# Patient Record
Sex: FEMALE | Race: WHITE
[De-identification: names, ages, dates, MRNs, and addresses within clinical notes are randomized per-mention and may not be internally consistent; named-entity substitution may affect disease eponyms.]

---

## 2022-12-05 ENCOUNTER — HOSPITAL ENCOUNTER (EMERGENCY)
Dept: HOSPITAL 75 - ER | Age: 22
Discharge: HOME | End: 2022-12-05
Payer: SELF-PAY

## 2022-12-05 VITALS — BODY MASS INDEX: 47.47 KG/M2 | WEIGHT: 257.94 LBS | HEIGHT: 61.81 IN

## 2022-12-05 VITALS — DIASTOLIC BLOOD PRESSURE: 77 MMHG | SYSTOLIC BLOOD PRESSURE: 124 MMHG

## 2022-12-05 DIAGNOSIS — Z88.1: ICD-10-CM

## 2022-12-05 DIAGNOSIS — J02.0: Primary | ICD-10-CM

## 2022-12-05 PROCEDURE — 99283 EMERGENCY DEPT VISIT LOW MDM: CPT

## 2022-12-05 NOTE — ED EENT
History of Present Illness


General


Chief Complaint:  Oral/Throat Problems


Stated Complaint:  STREP + | TONSILS SWOLLEN


Source:  patient


Exam Limitations:  no limitations





History of Present Illness


Date Seen by Provider:  Dec 5, 2022


Time Seen by Provider:  10:14


Initial Comments


22yoF with no pertinent PMH coming in due to sore throat in the setting of being

strep positive since Friday. Has been on amoxicillin since then, but not 

improving. Difficulty swallowing, no breathing difficulty.  No fever, cough, or 

any other concerns





Allergies and Home Medications


Allergies


Coded Allergies:  


     Penicillins (Verified  Allergy, Unknown, 12/5/22)


     clonidine (Verified  Allergy, Unknown, 12/5/22)


     morphine (Verified  Allergy, Unknown, 12/5/22)





Patient Home Medication List


Home Medication List Reviewed:  Yes





Review of Systems


Review of Systems


Constitutional:  No fever


Eyes:  No Symptoms Reported


Ears:  No Symptoms Reported


Nose:  no symptoms reported


Mouth:  no symptoms reported


Throat:  see HPI


Respiratory:  no symptoms reported


Cardiovascular:  no symptoms reported


Gastrointestinal:  no symptoms reported


Musculoskeletal:  no symptoms reported


Skin:  no symptoms reported


Neurological:  No Symptoms Reported


Hematologic/Lymphatic:  No Symptoms Reported


Immunological/Allergic:  no symptoms reported





All Other Systems Reviewed


Negative Unless Noted:  Yes





Past Medical-Social-Family Hx


Patient Social History


Tobacco Use?:  No


Substance use?:  No


Alcohol Use?:  No


Pt feels they are or have been:  No





Immunizations Up To Date


Influenza Vaccine Up-to-Date:  Yes; Up-to-Date


First/Initial COVID19 Vaccinat:  2021


Second COVID19 Vaccination Vinh:  2021





Past Medical History


Surgery/Hospitalization HX:  


GERD, PCOS





D&C


Surgeries:  Yes





Physical Exam


Height, Weight, BMI


Height: '"


Weight: lbs. oz. kg;  BMI


Method:


General Appearance:  WD/WN, no apparent distress


Eyes:  bilateral eye normal inspection


Ears:  bilateral ear auricle normal


Nose:  normal inspection


Mouth/Throat:  other (Tonsillar exudate and erythema but no swelling, uvula is 

midline, normal voice)


Neck:  non-tender, full range of motion, supple, normal inspection


Cardiovascular:  regular rate, rhythm, no edema, no murmur


Respiratory:  chest non-tender, lungs clear, normal breath sounds, no res

piratory distress, no accessory muscle use


Gastrointestinal:  normal bowel sounds, non tender, soft; No distended, No 

guarding, No rebound


Neurologic/Psychiatric:  no motor/sensory deficits, alert, normal mood/affect


Skin:  normal color, warm/dry





Progress/Results/Core Measures


Progress


Progress Note :  


Progress Note


22-year-old female above history coming in due to sore throat.  ABCs were intact

and vitals are stable on presentation.  Physical exam consistent with strep 

throat.  Given the documented allergy to penicillin with hives, we will change 

her over to clindamycin.  Also give her a steroid to try to help with the throat

discomfort.





Departure


Impression





   Primary Impression:  


   Streptococcal sore throat


Disposition:  01 HOME, SELF-CARE


Condition:  Stable





Departure-Patient Inst.


Decision time for Depature:  10:36


Referrals:  


DANIELLE ERICKSON DO (PCP)


Primary Care Physician








[facility]/CHRISTOPH (Family)


Primary Care Physician


Patient Instructions:  Strep Throat (DC)





Add. Discharge Instructions:  


You can throw away the amoxicillin, you will be on a new antibiotic for the next

week.  The steroid we gave you should be long-acting and will start to help 

later on today with the pain and discomfort.  Take the Tylenol as needed, and 

the Toradol for the pain.  Do not mix ibuprofen or naproxen with the Toradol.


Scripts


Ketorolac Tromethamine (Ketorolac Tromethamine) 10 Mg Tablet


10 MG PO Q6H for 3 Days, #12 TAB


   Prov: SKYLER BRASHER MD         12/5/22 


Clindamycin HCl (Clindamycin HCl) 300 Mg Capsule


300 MG PO QID for 7 Days, #28 CAP


   Prov: SKYLER BRASHER MD         12/5/22


Work/School Note:  Work Release Form   Date Seen in the Emergency Department:  

Dec 5, 2022


   Return to Work:  Dec 7, 2022


   Restrictions:  No Restrictions











SKYLER BRASHER MD           Dec 5, 2022 10:36

## 2023-03-03 ENCOUNTER — HOSPITAL ENCOUNTER (EMERGENCY)
Dept: HOSPITAL 75 - ER | Age: 23
Discharge: HOME | End: 2023-03-03
Payer: SELF-PAY

## 2023-03-03 VITALS — WEIGHT: 250 LBS | HEIGHT: 62.01 IN | BODY MASS INDEX: 45.43 KG/M2

## 2023-03-03 VITALS — DIASTOLIC BLOOD PRESSURE: 86 MMHG | SYSTOLIC BLOOD PRESSURE: 137 MMHG

## 2023-03-03 DIAGNOSIS — J03.91: Primary | ICD-10-CM

## 2023-03-03 DIAGNOSIS — Z88.0: ICD-10-CM

## 2023-03-03 DIAGNOSIS — E66.9: ICD-10-CM

## 2023-03-03 DIAGNOSIS — Z20.822: ICD-10-CM

## 2023-03-03 DIAGNOSIS — J20.9: ICD-10-CM

## 2023-03-03 DIAGNOSIS — J45.909: ICD-10-CM

## 2023-03-03 LAB
ALBUMIN SERPL-MCNC: 4 GM/DL (ref 3.2–4.5)
ALP SERPL-CCNC: 62 U/L (ref 40–136)
ALT SERPL-CCNC: 19 U/L (ref 0–55)
BASOPHILS # BLD AUTO: 0 10^3/UL (ref 0–0.1)
BASOPHILS NFR BLD AUTO: 0 % (ref 0–10)
BILIRUB SERPL-MCNC: 0.3 MG/DL (ref 0.1–1)
BUN/CREAT SERPL: 10
CALCIUM SERPL-MCNC: 8.8 MG/DL (ref 8.5–10.1)
CHLORIDE SERPL-SCNC: 103 MMOL/L (ref 98–107)
CO2 SERPL-SCNC: 25 MMOL/L (ref 21–32)
CREAT SERPL-MCNC: 0.71 MG/DL (ref 0.6–1.3)
EOSINOPHIL # BLD AUTO: 0 10^3/UL (ref 0–0.3)
EOSINOPHIL NFR BLD AUTO: 0 % (ref 0–10)
GFR SERPLBLD BASED ON 1.73 SQ M-ARVRAT: 123 ML/MIN
GLUCOSE SERPL-MCNC: 94 MG/DL (ref 70–105)
HCT VFR BLD CALC: 42 % (ref 35–52)
HGB BLD-MCNC: 14.4 G/DL (ref 11.5–16)
LYMPHOCYTES # BLD AUTO: 1.4 10^3/UL (ref 1–4)
LYMPHOCYTES NFR BLD AUTO: 15 % (ref 12–44)
MANUAL DIFFERENTIAL PERFORMED BLD QL: NO
MCH RBC QN AUTO: 28 PG (ref 25–34)
MCHC RBC AUTO-ENTMCNC: 34 G/DL (ref 32–36)
MCV RBC AUTO: 84 FL (ref 80–99)
MONOCYTES # BLD AUTO: 1.1 10^3/UL (ref 0–1)
MONOCYTES NFR BLD AUTO: 11 % (ref 0–12)
NEUTROPHILS # BLD AUTO: 7 10^3/UL (ref 1.8–7.8)
NEUTROPHILS NFR BLD AUTO: 73 % (ref 42–75)
PLATELET # BLD: 221 10^3/UL (ref 130–400)
PMV BLD AUTO: 10.6 FL (ref 9–12.2)
POTASSIUM SERPL-SCNC: 3.4 MMOL/L (ref 3.6–5)
PROT SERPL-MCNC: 7.2 GM/DL (ref 6.4–8.2)
SODIUM SERPL-SCNC: 139 MMOL/L (ref 135–145)
WBC # BLD AUTO: 9.6 10^3/UL (ref 4.3–11)

## 2023-03-03 PROCEDURE — 86141 C-REACTIVE PROTEIN HS: CPT

## 2023-03-03 PROCEDURE — 80053 COMPREHEN METABOLIC PANEL: CPT

## 2023-03-03 PROCEDURE — 87430 STREP A AG IA: CPT

## 2023-03-03 PROCEDURE — 85025 COMPLETE CBC W/AUTO DIFF WBC: CPT

## 2023-03-03 PROCEDURE — 36415 COLL VENOUS BLD VENIPUNCTURE: CPT

## 2023-03-03 PROCEDURE — 86308 HETEROPHILE ANTIBODY SCREEN: CPT

## 2023-03-03 PROCEDURE — 84703 CHORIONIC GONADOTROPIN ASSAY: CPT

## 2023-03-03 PROCEDURE — 71046 X-RAY EXAM CHEST 2 VIEWS: CPT

## 2023-03-03 PROCEDURE — 87636 SARSCOV2 & INF A&B AMP PRB: CPT

## 2023-03-03 NOTE — DIAGNOSTIC IMAGING REPORT
Indication: Sore throat and throat swelling as well as chest

congestion increasing shortness of air.



Time of Exam: 8:23 AM



No prior studies are available for comparison.



Findings: The heart size is normal. The pulmonary vascularity is

unremarkable. The lungs are clear. No infiltrate, effusion or

pneumothorax is detected.



Impression: No acute cardiopulmonary process is detected.



Dictated by: 



  Dictated on workstation # [id number]

## 2023-03-03 NOTE — ED GENERAL
General


Chief Complaint:  Oral/Throat Problems


Stated Complaint:  SOB


Nursing Triage Note:  


C/O SORE THROAT/SWELLING, CHEST CONGESTION, INCREASED SOA. REPORTS PROGRESSIVELY




WORSE OVER LAST 2 WEEKS. SEEN AT [facility] 3/1/23 STARTED ON PREDNISONE, ZYRTEC, 


FLONASE WITHOUT IMPROVEMENT.


Source of Information:  Patient


Exam Limitations:  No Limitations





History of Present Illness


Date Seen by Provider:  Mar 3, 2023


Time Seen by Provider:  06:46


Initial Comments


This 22-year-old young lady presents to the emergency room with 2 weeks of acute

illness including sore throat, swollen tonsils, fatigue, difficulty swallowing, 

generalized myalgia, diarrhea, shortness of breath, and sharp pain in the chest 

with breathing.  She has not had fevers that she is aware of.  She did present 

to the clinic and was prescribed steroids which she believes were not very 

helpful.  She does have a history of tonsillitis and strep throat in the past.  

She reports chronic tonsillar hypertrophy.  She also has a history of asthma but

does not presently use any inhaled medications.  She is notably tachypneic and 

tachycardic during assessment.  She reports no testing for viral illnesses or 

strep throat was performed in the clinic.





Allergies and Home Medications


Allergies


Coded Allergies:  


     Penicillins (Verified  Allergy, Unknown, 22)


     clonidine (Verified  Allergy, Unknown, 22)


     morphine (Verified  Allergy, Unknown, 22)





Patient Home Medication List


Home Medication List Reviewed:  Yes


Azithromycin (Azithromycin) 250 Mg Tablet, 500 MG PO DAILY


   Prescribed by: JENNIFER BURGER on 3/3/23 09


Cetirizine HCl (Zyrtec) 10 Mg Capsule, 10 MG PO, (Reported)


   Entered as Reported by: MARIANO VASQUEZ on 3/3/23 0645


   Last Action: New Order


Fluticasone Propionate (Flonase Allergy Relief) 50 Mcg/Actuation [place].susp, 2 

SPRAY NS DAILY, (Reported)


   Entered as Reported by: MARIANO VASQUEZ on 3/3/23 0645


   Last Action: New Order


Prednisone (Prednisone) 1 Mg Tab, Unknown Dose PO, (Reported)


   Entered as Reported by: MARIANO VASQUEZ on 3/3/23 0645


   Last Action: New Order


Discontinued Medications


Clindamycin HCl (Clindamycin HCl) 300 Mg Capsule, 300 MG PO QID


   Discontinued Reason: No Longer Taking


   Prescribed by: SKYLER BRASHER on 22 1037


   Last Action: Discontinued


Ketorolac Tromethamine (Ketorolac Tromethamine) 10 Mg Tablet, 10 MG PO Q6H


   Discontinued Reason: No Longer Taking


   Prescribed by: SKYLER BRASHER on 22 1037


   Last Action: Discontinued





Review of Systems


Review of Systems


Constitutional:  see HPI


EENTM:  see HPI


Respiratory:  see HPI


Cardiovascular:  see HPI


Gastrointestinal:  see HPI


Genitourinary:  no symptoms reported


Musculoskeletal:  see HPI


Skin:  no symptoms reported


Psychiatric/Neurological:  No Symptoms Reported


Hematologic/Lymphatic:  No Symptoms Reported


Immunological/Allergic:  no symptoms reported





Past Medical-Social-Family Hx


Patient Social History


Tobacco Use?:  No


Substance use?:  No


Alcohol Use?:  No


Pt feels they are or have been:  No





Immunizations Up To Date


First/Initial COVID19 Vaccinat:  


Second COVID19 Vaccination Vinh:  


Third COVID19 Vaccination Date:  





Past Medical History


Surgery/Hospitalization HX:  


GERD, PCOS





D&C


Surgeries:  Yes (Ectopic pregnancy)


Respiratory:  Yes


Asthma


Cardiac:  No


Neurological:  No


Pregnant:  No


Reproductive Disorders:  Yes


Female Reproductive Disorders:  Polycystic Ovarian Dis


Genitourinary:  No


Gastrointestinal:  Yes


Gastroesophageal Reflux


Musculoskeletal:  No


Endocrine:  Yes ("Thyroid problem")


HEENT:  Yes (Chronic tonsillar hypertrophy)


Cancer:  No


Psychosocial:  Yes


Integumentary:  No





Physical Exam


Vital Signs





Vital Signs - First Documented








 3/3/23





 06:40


 


Temp 37.4


 


Pulse 124


 


Resp 24


 


B/P (MAP) 152/97 (115)


 


Pulse Ox 100


 


O2 Delivery Room Air





Capillary Refill : Less Than 3 Seconds


Height, Weight, BMI


Height: '"


Weight: lbs. oz. kg; 45.00 BMI


Method:


General Appearance:  WD/WN, Mild Distress, Obese


HEENT:  PERRL/EOMI, TMs Normal, Pharyngeal Erythema, Tonsillar Exudate, 

Tonsillar Enlargement (Marked, nearly touching, symmetric)


Neck:  Normal Inspection


Respiratory:  Lungs Clear, No Accessory Muscle Use, No Respiratory Distress, Dec

reased Breath Sounds; No Wheezing; Other (Tachypnea, decreased air movement, 

prolonged expiratory phase)


Cardiovascular:  No Edema, No Murmur, Tachycardia


Extremity:  Normal Inspection


Neurologic/Psychiatric:  Alert, Oriented x3, No Motor/Sensory Deficits, Normal 

Mood/Affect


Skin:  Normal Color, Warm/Dry





Progress/Results/Core Measures


Suspected Sepsis


SIRS


Temperature: 


Pulse: 124 


Respiratory Rate: 24


 


Laboratory Tests


3/3/23 07:07: White Blood Count 9.6


Blood Pressure 152 /97 


Mean: 115


 





Laboratory Tests


3/3/23 07:07: 


Creatinine 0.71, Platelet Count 221, Total Bilirubin 0.3








Results/Orders


Lab Results





Laboratory Tests








Test


 3/3/23


06:53 3/3/23


06:58 3/3/23


07:07 Range/Units


 


 


Group A Streptococcus Screen NEGATIVE    NEGATIVE  


 


Influenza Type A (RT-PCR)  Not Detected   Not Detecte  


 


Influenza Type B (RT-PCR)  Not Detected   Not Detecte  


 


SARS-CoV-2 RNA (RT-PCR)  Not Detected   Not Detecte  


 


White Blood Count


 


 


 9.6 


 4.3-11.0


10^3/uL


 


Red Blood Count


 


 


 5.07 


 3.80-5.11


10^6/uL


 


Hemoglobin   14.4  11.5-16.0  g/dL


 


Hematocrit   42  35-52  %


 


Mean Corpuscular Volume   84  80-99  fL


 


Mean Corpuscular Hemoglobin   28  25-34  pg


 


Mean Corpuscular Hemoglobin


Concent 


 


 34 


 32-36  g/dL





 


Red Cell Distribution Width   12.9  10.0-14.5  %


 


Platelet Count


 


 


 221 


 130-400


10^3/uL


 


Mean Platelet Volume   10.6  9.0-12.2  fL


 


Immature Granulocyte % (Auto)   1   %


 


Neutrophils (%) (Auto)   73  42-75  %


 


Lymphocytes (%) (Auto)   15  12-44  %


 


Monocytes (%) (Auto)   11  0-12  %


 


Eosinophils (%) (Auto)   0  0-10  %


 


Basophils (%) (Auto)   0  0-10  %


 


Neutrophils # (Auto)


 


 


 7.0 


 1.8-7.8


10^3/uL


 


Lymphocytes # (Auto)


 


 


 1.4 


 1.0-4.0


10^3/uL


 


Monocytes # (Auto)


 


 


 1.1 H


 0.0-1.0


10^3/uL


 


Eosinophils # (Auto)


 


 


 0.0 


 0.0-0.3


10^3/uL


 


Basophils # (Auto)


 


 


 0.0 


 0.0-0.1


10^3/uL


 


Immature Granulocyte # (Auto)


 


 


 0.1 


 0.0-0.1


10^3/uL


 


Sodium Level   139  135-145  MMOL/L


 


Potassium Level   3.4 L 3.6-5.0  MMOL/L


 


Chloride Level   103    MMOL/L


 


Carbon Dioxide Level   25  21-32  MMOL/L


 


Anion Gap   11  5-14  MMOL/L


 


Blood Urea Nitrogen   7  7-18  MG/DL


 


Creatinine


 


 


 0.71 


 0.60-1.30


MG/DL


 


Estimat Glomerular Filtration


Rate 


 


 123 


  





 


BUN/Creatinine Ratio   10   


 


Glucose Level   94    MG/DL


 


Calcium Level   8.8  8.5-10.1  MG/DL


 


Corrected Calcium   8.8  8.5-10.1  MG/DL


 


Total Bilirubin   0.3  0.1-1.0  MG/DL


 


Aspartate Amino Transf


(AST/SGOT) 


 


 14 


 5-34  U/L





 


Alanine Aminotransferase


(ALT/SGPT) 


 


 19 


 0-55  U/L





 


Alkaline Phosphatase   62    U/L


 


C-Reactive Protein High


Sensitivity 


 


 4.43 H


 0.00-0.50


MG/DL


 


Total Protein   7.2  6.4-8.2  GM/DL


 


Albumin   4.0  3.2-4.5  GM/DL


 


Serum Pregnancy Test,


Qualitative 


 


 NEGATIVE 


 NEGATIVE  





 


Monoscreen   NEGATIVE  NEGATIVE  








My Orders





Orders - JENNIFER POWERS MD


Cbc With Automated Diff (3/3/23 06:56)


Comprehensive Metabolic Panel (3/3/23 06:56)


Hs C Reactive Protein (3/3/23 06:56)


Hcg,Qualitative Serum (3/3/23 06:56)


Monotest (3/3/23 06:56)


Rapid Strep A Screen (3/3/23 06:56)


Covid 19 Inhouse Test (3/3/23 06:56)


Influenza A And B By Pcr (3/3/23 06:56)


Ed Iv/Invasive Line Start (3/3/23 06:56)


Lactated Ringers (Lr 1000 Ml Iv Solution (3/3/23 07:00)


Ketorolac Injection (Toradol Injection) (3/3/23 07:00)


Albuterol Inhaler (Albuterol) (3/3/23 07:02)


Chest Pa/Lat (2 View) (3/3/23 08:00)





Medications Given in ED





Current Medications








 Medications  Dose


 Ordered  Sig/Elizabeth


 Route  Start Time


 Stop Time Status Last Admin


Dose Admin


 


 Ketorolac


 Tromethamine  30 mg  ONCE  ONCE


 IVP  3/3/23 07:00


 3/3/23 07:01 DC 3/3/23 07:05


30 MG


 


 Lactated Ringer's  1,000 ml @ 


 0 mls/hr  Q0M ONCE


 IV  3/3/23 07:00


 3/3/23 07:01 DC 3/3/23 07:05


999 MLS/HR








Vital Signs/I&O











 3/3/23 3/3/23





 06:40 09:17


 


Temp 37.4 36.5


 


Pulse 124 87


 


Resp 24 17


 


B/P (MAP) 152/97 (115) 137/86


 


Pulse Ox 100 98


 


O2 Delivery Room Air Room Air





Capillary Refill : Less Than 3 Seconds








Blood Pressure Mean:                    115








Progress Note #1:  


   Time:  07:11


Progress Note


Patient has been interviewed and examined.  This is a prolonged illness with 

worsening condition despite treatment with steroids.  She is notably tachycardic

and tachypneic with difficulty breathing and markedly enlarged tonsils.  This 

prolonged illness should be evaluated further with labs, strep testing, and 

viral testing.  Work-up including CBC, CMP, CRP, monoscreen, viral screening for

influenza and COVID, and rapid strep test is being performed.  If serum 

pregnancy test is negative, chest x-ray will also be obtained.  IV fluids are in

fusing.  Pain is being treated with Toradol.  Shortness of breath with prolonged

expiratory phase is being treated with an albuterol inhaler.  Further therapies 

will depend on results of tests.


Progress Note #2:  


Progress Note


Work-up was essentially unremarkable.  CBC, CMP, and CRP were reviewed by me and

were normal.  Screening test for infectious illnesses including mono, influenza,

COVID-19, and rapid strep were also all negative.  Chest x-ray was viewed by me 

and no acute abnormalities were appreciated.  Radiologist report was also 

reviewed and no acute abnormalities were appreciated.  Patient had prolonged 

expiratory phase on auscultation and albuterol inhaler was provided.  This did 

improve her breathing some.  She reports Toradol did not help her discomfort 

much.  Her heart rate did drop into the 1 teens after 300 mL of IV fluids and 

Toradol.  She only received 300 mL of IV fluids before her IV failed.  Patient 

was discharged in somewhat improved condition with a prescription for 

azithromycin.  She has tried amoxicillin and Augmentin in the past without much 

improvement in symptoms.  She has a penicillin allergy but can take amoxicillin 

products.  I advised referral to an ENT provider, but her lack of insurance 

makes this prohibitive.  We discussed options for looking into other types of 

insurance coverage.  Discharge instructions, prescription, and use of 

over-the-counter medications were reviewed with the patient.  See discharge 

instructions for further discussion.





Diagnostic Imaging





   Diagonstic Imaging:  Xray


   Plain Films/CT/US/NM/MRI:  chest


Comments


Chest x-ray viewed by me and report reviewed.  No acute abnormalities were 

appreciated by my interpretation.  Radiologist's report was also reviewed as 

below:





NAME:   ALEJANDRA WEINER  


[facility] REC#:   [id number]  


ACCOUNT#:   [id number]  


PT STATUS:   REG ER  


:   2000  


PHYSICIAN:   JENNIFER POWERS MD  


ADMIT DATE:   23/ER  


                                                                      

***Draft***  


Date of Exam:23  





CHEST PA/LAT (2 VIEW)  





Indication: Sore throat and throat swelling as well as chest  


 congestion increasing shortness of air.  





 Time of Exam: 8:23 AM  





 No prior studies are available for comparison.  





 Findings: The heart size is normal. The pulmonary vascularity is  


 unremarkable. The lungs are clear. No infiltrate, effusion or  


 pneumothorax is detected.  





 Impression: No acute cardiopulmonary process is detected.  





   Dictated on workstation # [id number]  





Dict:   23  


Trans:   23 08  


CV [id number]  





Interpreted by:     SABI JJ MD





Departure


Impression





   Primary Impression:  


   Recurrent tonsillitis


   Additional Impressions:  


   Acute bronchitis


   Qualified Codes:  J20.9 - Acute bronchitis, unspecified


   Flu-like symptoms


Disposition:  01 HOME, SELF-CARE


Condition:  Stable





Admissions


Decision to Admit/Date:  Mar 3, 2023


Time/Decision to Admit Time:  08:54





Departure-Patient Inst.


Decision time for Depature:  08:54


Referrals:  


BRIELLE BARAJAS (PCP)


Primary Care Physician








[facility]/CHRISTOPH (Family)


Primary Care Physician


Patient Instructions:  Sore Throat in Adults





Add. Discharge Instructions:  


Drink plenty of clear liquids to stay well-hydrated.


For pain or fever you may take ibuprofen up to 600 mg every 6 hours as needed 

and Tylenol (acetaminophen) up to 1000 mg every 6 hours as needed.


Complete the entire course of azithromycin as prescribed.  You should take 2 

tablets daily for 5 days.


Seek follow-up with your primary care provider next week to review throat 

culture results and to further evaluate your recurrent tonsillitis and a plan 

for further treatment of recurrent tonsillitis.


Use your inhaler up to 4 puffs in a 4-hour period of time as needed for 

wheezing, uncontrolled cough, or difficulty with expiration.


For your nasal congestion you may use over-the-counter Afrin (Oxymetazoline).  

Limit Afrin to 3 days only.  You may use over-the-counter Flonase (fluticasone) 

as a maintenance medication for sinus congestion, nasal allergies, etc.


On day 3 or 4 of antibiotics and again the day after completing antibiotic, 

sanitize or replace any oral instruments such as toothbrushes.


You may finish the steroids as prescribed.


Complete financial aid paperwork for the hospital if you have not already done 

so and seek options for insurance coverage on the exchange or through your 

family's employers.


Work toward weight loss to improve your general health and especially to improve

problems with breathing and fullness in the throat that may cause more severe 

symptoms with tonsillitis, sleep apnea, etc.


Return to care if you have worsening symptoms despite following these 

instructions.





All discharge instructions reviewed with patient and/or family. Voiced 

understanding.


Scripts


Azithromycin (Azithromycin) 250 Mg Tablet


500 MG PO DAILY, #10 TAB 0 Refills


   Prov: JENNIFER POWERS MD         3/3/23


Work/School Note:  Work Release Form   Date Seen in the Emergency Department:  

Mar 3, 2023


   Return to Work:  Mar 4, 2023


   Restrictions:  Return-No Fever (24hrs), Return-No Vomiting(24hrs)





Copy


Copies To 1:   [facility]/JENNIFER LIGHT MD         Mar 3, 2023 07:08

## 2023-03-04 ENCOUNTER — HOSPITAL ENCOUNTER (EMERGENCY)
Dept: HOSPITAL 75 - ER | Age: 23
Discharge: HOME | End: 2023-03-04
Payer: SELF-PAY

## 2023-03-04 VITALS — WEIGHT: 250 LBS | HEIGHT: 62.01 IN | BODY MASS INDEX: 45.43 KG/M2

## 2023-03-04 VITALS — DIASTOLIC BLOOD PRESSURE: 79 MMHG | SYSTOLIC BLOOD PRESSURE: 113 MMHG

## 2023-03-04 DIAGNOSIS — Z88.0: ICD-10-CM

## 2023-03-04 DIAGNOSIS — A41.9: ICD-10-CM

## 2023-03-04 DIAGNOSIS — Z20.822: ICD-10-CM

## 2023-03-04 DIAGNOSIS — J03.91: Primary | ICD-10-CM

## 2023-03-04 LAB
ALBUMIN SERPL-MCNC: 3.8 GM/DL (ref 3.2–4.5)
ALP SERPL-CCNC: 65 U/L (ref 40–136)
ALT SERPL-CCNC: 28 U/L (ref 0–55)
BASOPHILS # BLD AUTO: 0 10^3/UL (ref 0–0.1)
BASOPHILS NFR BLD AUTO: 0 % (ref 0–10)
BILIRUB SERPL-MCNC: 0.4 MG/DL (ref 0.1–1)
BUN/CREAT SERPL: 10
CALCIUM SERPL-MCNC: 9.2 MG/DL (ref 8.5–10.1)
CHLORIDE SERPL-SCNC: 103 MMOL/L (ref 98–107)
CO2 SERPL-SCNC: 22 MMOL/L (ref 21–32)
CREAT SERPL-MCNC: 0.68 MG/DL (ref 0.6–1.3)
EOSINOPHIL # BLD AUTO: 0 10^3/UL (ref 0–0.3)
EOSINOPHIL NFR BLD AUTO: 1 % (ref 0–10)
GFR SERPLBLD BASED ON 1.73 SQ M-ARVRAT: 126 ML/MIN
GLUCOSE SERPL-MCNC: 95 MG/DL (ref 70–105)
HCT VFR BLD CALC: 40 % (ref 35–52)
HGB BLD-MCNC: 13.5 G/DL (ref 11.5–16)
LIPASE SERPL-CCNC: 34 U/L (ref 8–78)
LYMPHOCYTES # BLD AUTO: 2.3 10^3/UL (ref 1–4)
LYMPHOCYTES NFR BLD AUTO: 26 % (ref 12–44)
MANUAL DIFFERENTIAL PERFORMED BLD QL: NO
MCH RBC QN AUTO: 28 PG (ref 25–34)
MCHC RBC AUTO-ENTMCNC: 34 G/DL (ref 32–36)
MCV RBC AUTO: 82 FL (ref 80–99)
MONOCYTES # BLD AUTO: 1 10^3/UL (ref 0–1)
MONOCYTES NFR BLD AUTO: 12 % (ref 0–12)
NEUTROPHILS # BLD AUTO: 5.2 10^3/UL (ref 1.8–7.8)
NEUTROPHILS NFR BLD AUTO: 60 % (ref 42–75)
PLATELET # BLD: 238 10^3/UL (ref 130–400)
PMV BLD AUTO: 10.4 FL (ref 9–12.2)
POTASSIUM SERPL-SCNC: 3.6 MMOL/L (ref 3.6–5)
PROT SERPL-MCNC: 7.4 GM/DL (ref 6.4–8.2)
SODIUM SERPL-SCNC: 137 MMOL/L (ref 135–145)
WBC # BLD AUTO: 8.6 10^3/UL (ref 4.3–11)

## 2023-03-04 PROCEDURE — 87430 STREP A AG IA: CPT

## 2023-03-04 PROCEDURE — 86141 C-REACTIVE PROTEIN HS: CPT

## 2023-03-04 PROCEDURE — 87636 SARSCOV2 & INF A&B AMP PRB: CPT

## 2023-03-04 PROCEDURE — 80053 COMPREHEN METABOLIC PANEL: CPT

## 2023-03-04 PROCEDURE — 85025 COMPLETE CBC W/AUTO DIFF WBC: CPT

## 2023-03-04 PROCEDURE — 83690 ASSAY OF LIPASE: CPT

## 2023-03-04 PROCEDURE — 36415 COLL VENOUS BLD VENIPUNCTURE: CPT

## 2023-03-04 PROCEDURE — 70491 CT SOFT TISSUE NECK W/DYE: CPT

## 2023-03-04 PROCEDURE — 87040 BLOOD CULTURE FOR BACTERIA: CPT

## 2023-03-04 PROCEDURE — 83605 ASSAY OF LACTIC ACID: CPT

## 2023-03-04 NOTE — DIAGNOSTIC IMAGING REPORT
PROCEDURE: CT neck soft tissue with contrast.



TECHNIQUE: Multiple contiguous axial images were obtained through

the neck after the administration of contrast. Auto Exposure

Controls were utilized during the CT exam to meet ALARA standards

for radiation dose reduction. 



INDICATION: Sore throat, difficulty swallowing. 



FINDINGS: There is symmetrical low-density edematous thickening

of the nasopharynx, oropharynx and hypopharyngeal mucosa,

consistent with pharyngitis. No abscess or fluid collection,

however. The free edge of the epiglottis appears unremarkable.

The aryepiglottic folds normal. Focal folds and remaining

structures of the larynx normal. The infra-laryngeal trachea

patent. Prevertebral and retropharyngeal spaces normal. Parotid

submandibular and thyroid glands unremarkable. There is some

reactive cervical lymphadenopathy, bilaterally. Paranasal sinuses

clear. Bony structures nonacute. 



IMPRESSION: Findings of a significant degree of pharyngitis but

no abscess or drainable fluid collection and no evidence for

epiglottitis. 



Dictated by: 



  Dictated on workstation # [id number]

## 2023-03-04 NOTE — ED EENT
History of Present Illness


General


Stated Complaint:  SORE THROAT/DIFFICULTY SWALLOWING/SOA


Source:  patient


Exam Limitations:  no limitations


 (SKYLER ZAPATA)





History of Present Illness


Date Seen by Provider:  Mar 4, 2023


Time Seen by Provider:  20:45


Initial Comments


Patient is a 22-year-old female who presents ED with 2 weeks of sore throat, 

difficulty swallowing and shortness of breath.  History of recurring 

tonsillitis.  She states she was seen here yesterday and prescribed 

azithromycin.  She was seen this past week started on ProAir at his own.  She 

had a negative COVID, influenza and strep swab.  She states this is the third 

time over the past few months that she has had this reoccurring tonsillitis.  

She states this evening she started having difficulty swallowing and with pain 

with eating.  Denies of any wheezing.  Has been using albuterol inhaler without 

much improvement.  She reports nasal congestion without any sinus pressure.  She

is febrile and tachycardic on arrival.  Sepsis work-up was initiated started on 

clindamycin.  Denies vomiting, diarrhea, dysuria, hematuria, concern for 

pregnancy, visual changes, unable to tolerate


 (SKYLER ZAPATA)





Allergies and Home Medications


Allergies


Coded Allergies:  


     Penicillins (Verified  Allergy, Unknown, 12/5/22)


     clonidine (Verified  Allergy, Unknown, 12/5/22)


     morphine (Verified  Allergy, Unknown, 12/5/22)





Patient Home Medication List


Home Medication List Reviewed:  Yes


 (SKYLER ZAPATA)


Azithromycin (Azithromycin) 250 Mg Tablet, 500 MG PO DAILY


   Prescribed by: JENNIFER BURGER on 3/3/23 0902


Cefuroxime Axetil (Cefuroxime) 250 Mg Tablet, 250 MG PO BID


   Prescribed by: FRANCISCO J GARDINER on 3/4/23 2208


Cetirizine HCl (Zyrtec) 10 Mg Capsule, 10 MG PO, (Reported)


   Entered as Reported by: MARIANO VASQUEZ on 3/3/23 0645


Fluticasone Propionate (Flonase Allergy Relief) 50 Mcg/Actuation [place].susp, 2 

SPRAY NS DAILY, (Reported)


   Entered as Reported by: MARIANO VASQUEZ on 3/3/23 0645


Prednisone (Prednisone) 1 Mg Tab, Unknown Dose PO, (Reported)


   Entered as Reported by: MARIANO VASQUEZ on 3/3/23 0645


Prednisone (Prednisone) 20 Mg Tab, 20 MG PO UD


   Prescribed by: FRANCISCO J GARDINER on 3/4/23 2208


Discontinued Medications


Clindamycin HCl (Clindamycin HCl) 300 Mg Capsule, 300 MG PO QID


   Discontinued Reason: No Longer Taking


   Prescribed by: SKYLER BRASHER on 12/5/22 1037


Ketorolac Tromethamine (Ketorolac Tromethamine) 10 Mg Tablet, 10 MG PO Q6H


   Discontinued Reason: No Longer Taking


   Prescribed by: SKYLER BRASHER on 12/5/22 1037





Review of Systems


Review of Systems


Constitutional:  chills; No diaphoresis; fever, malaise; No weakness


Eyes:  Denies Blurred Vision, Denies Drainage, Denies Decreased Acuity, Denies 

Inflammation, Denies Pain, Denies Photophobia


Ears:  Denies Dizziness, Denies Bloody Discharge


Nose:  congestion


Mouth:  swelling


Throat:  pain, swelling, painful swallowing, difficulty with fluids


Respiratory:  No see HPI, No cough, No dyspnea on exertion


Cardiovascular:  No chest pain


Gastrointestinal:  No abdominal pain, No diarrhea, No nausea, No vomiting


Musculoskeletal:  No back pain, No joint pain


Skin:  change in color, change in hair/nails (SKYLER ZAPATA)





All Other Systems Reviewed


Negative Unless Noted:  Yes


 (SKYLER ZAPATA)





Past Medical-Social-Family Hx


Immunizations Up To Date


First/Initial COVID19 Vaccinat:  2021


Second COVID19 Vaccination Vinh:  2021


Third COVID19 Vaccination Date:  2022


 (SKYLER ZAPATA)





Past Medical History


Surgery/Hospitalization HX:  


GERD, PCOS





D&C


Surgeries:  Yes (Ectopic pregnancy)


Respiratory:  Yes


Asthma


Cardiac:  No


Neurological:  No


Reproductive Disorders:  Yes


Female Reproductive Disorders:  Polycystic Ovarian Dis


Genitourinary:  No


Gastrointestinal:  Yes


Gastroesophageal Reflux


Musculoskeletal:  No


Endocrine:  Yes ("Thyroid problem")


HEENT:  Yes (Chronic tonsillar hypertrophy)


Cancer:  No


Psychosocial:  Yes


Integumentary:  No


 (SKYLER ZAPATA)





Physical Exam


Vital Signs





Vital Signs - First Documented








 3/4/23





 20:28


 


Temp 37.9


 


Pulse 119


 


Resp 20


 


B/P (MAP) 140/93 (109)


 


Pulse Ox 96


 


O2 Delivery Room Air





 (ELIZABETH TERESAA K DO)


Height, Weight, BMI


Height: '"


Weight: lbs. oz. kg; 45.00 BMI


Method:


General Appearance:  WD/WN, no apparent distress


Eyes:  bilateral eye normal inspection, bilateral eye PERRL, bilateral eye EOMI


Ears:  bilateral ear auricle normal, bilateral ear canal normal, bilateral ear 

TM normal, bilateral ear bleeding


Nose:  normal inspection


Mouth/Throat:  other (Oropharynx patent with exudate right tonsil.  No uvula 

deviation.  Tolerate secretions.  Cervical adenopathy)


Neck:  non-tender, full range of motion, supple, normal inspection


Cardiovascular:  no edema, no gallop, no JVD, tachycardia


Respiratory:  chest non-tender, lungs clear, normal breath sounds, no res

piratory distress


Gastrointestinal:  normal bowel sounds, non tender, soft, no organomegaly


Neurologic/Psychiatric:  CNs II-XII nml as tested, no motor/sensory deficits, 

alert, normal mood/affect, oriented x 3


Skin:  normal color, warm/dry (SKYLER ZAPATA)





Progress/Results/Core Measures


Results/Orders


Lab Results





Laboratory Tests








Test


 3/4/23


20:35 3/4/23


20:48 Range/Units


 


 


Influenza Type A (RT-PCR) Not Detected   Not Detecte  


 


Influenza Type B (RT-PCR) Not Detected   Not Detecte  


 


SARS-CoV-2 RNA (RT-PCR) Not Detected   Not Detecte  


 


Group A Streptococcus Screen NEGATIVE   NEGATIVE  


 


White Blood Count


 


 8.6 


 4.3-11.0


10^3/uL


 


Red Blood Count


 


 4.84 


 3.80-5.11


10^6/uL


 


Hemoglobin  13.5  11.5-16.0  g/dL


 


Hematocrit  40  35-52  %


 


Mean Corpuscular Volume  82  80-99  fL


 


Mean Corpuscular Hemoglobin  28  25-34  pg


 


Mean Corpuscular Hemoglobin


Concent 


 34 


 32-36  g/dL





 


Red Cell Distribution Width  12.6  10.0-14.5  %


 


Platelet Count


 


 238 


 130-400


10^3/uL


 


Mean Platelet Volume  10.4  9.0-12.2  fL


 


Immature Granulocyte % (Auto)  1   %


 


Neutrophils (%) (Auto)  60  42-75  %


 


Lymphocytes (%) (Auto)  26  12-44  %


 


Monocytes (%) (Auto)  12  0-12  %


 


Eosinophils (%) (Auto)  1  0-10  %


 


Basophils (%) (Auto)  0  0-10  %


 


Neutrophils # (Auto)


 


 5.2 


 1.8-7.8


10^3/uL


 


Lymphocytes # (Auto)


 


 2.3 


 1.0-4.0


10^3/uL


 


Monocytes # (Auto)


 


 1.0 


 0.0-1.0


10^3/uL


 


Eosinophils # (Auto)


 


 0.0 


 0.0-0.3


10^3/uL


 


Basophils # (Auto)


 


 0.0 


 0.0-0.1


10^3/uL


 


Immature Granulocyte # (Auto)


 


 0.0 


 0.0-0.1


10^3/uL


 


Sodium Level  137  135-145  MMOL/L


 


Potassium Level  3.6  3.6-5.0  MMOL/L


 


Chloride Level  103    MMOL/L


 


Carbon Dioxide Level  22  21-32  MMOL/L


 


Anion Gap  12  5-14  MMOL/L


 


Blood Urea Nitrogen  7  7-18  MG/DL


 


Creatinine


 


 0.68 


 0.60-1.30


MG/DL


 


Estimat Glomerular Filtration


Rate 


 126 


  





 


BUN/Creatinine Ratio  10   


 


Glucose Level  95    MG/DL


 


Lactic Acid Level


 


 0.60 


 0.50-2.00


MMOL/L


 


Calcium Level  9.2  8.5-10.1  MG/DL


 


Corrected Calcium  9.4  8.5-10.1  MG/DL


 


Total Bilirubin  0.4  0.1-1.0  MG/DL


 


Aspartate Amino Transf


(AST/SGOT) 


 23 


 5-34  U/L





 


Alanine Aminotransferase


(ALT/SGPT) 


 28 


 0-55  U/L





 


Alkaline Phosphatase  65    U/L


 


C-Reactive Protein High


Sensitivity 


 11.45 H


 0.00-0.50


MG/DL


 


Total Protein  7.4  6.4-8.2  GM/DL


 


Albumin  3.8  3.2-4.5  GM/DL


 


Lipase  34  8-78  U/L





 (MALICK,HORACE K DO)


Medications Given in ED





Current Medications








 Medications  Dose


 Ordered  Sig/Elizabeth


 Route  Start Time


 Stop Time Status Last Admin


Dose Admin


 


 Iohexol  75 ml  ONCE  ONCE


 IV  3/4/23 21:00


 3/4/23 21:01 DC 3/4/23 21:10


75 ML


 


 Ketorolac


 Tromethamine  30 mg  ONCE  ONCE


 IVP  3/4/23 20:45


 3/4/23 20:46 DC 3/4/23 20:49


30 MG


 


 Sodium Chloride  100 ml  ONCE  ONCE


 IV  3/4/23 21:00


 3/4/23 21:01 DC 3/4/23 21:10


80 ML





 (HORACE TERESA DO)


Vital Signs/I&O











 3/4/23 3/4/23





 20:28 22:52


 


Temp 37.9 


 


Pulse 119 102


 


Resp 20 18


 


B/P (MAP) 140/93 (109) 113/79


 


Pulse Ox 96 98


 


O2 Delivery Room Air Room Air





 (HORACE TERESA DO)





Departure


Communication (PCP)


 reviewed previous ER visit, lab tests.  History of recurrent tonsillitis.  She 

reports 10 episodes over the past year.  She was seen here yesterday diagnosed 

with pharyngitis was discharged with azithromycin and albuterol inhaler.  She 

states she started prednisone unknown dose on March 1 prescribed by [facility].  She 

states this evening she was having difficulty swallowing with pain difficulty 

breathing.  Her oxygen on room air was 96%.  No stridor, wheezing.  Tolerating 

secretions.  Patient had a negative strep, COVID and influenza and mono 

yesterday.  Negative strep today.  Culture pending.  Patient was tachycardic and

febrile.  Due to worsening symptoms sepsis work-up was initiated.  She was 

started on a liter of fluid and was given Toradol for the fever and pain.  CBC, 

CMP was otherwise unremarkable.  Normal lactic acid.  Blood cultures pending.  

Was started on IV clindamycin.  No uvula deviation on exam of her throat.  

Exudate noted on the right tonsil.  Cervical adenopathy.  No tenderness to the 

floor the mouth.  Due to worsening symptoms difficulty breathing CT of the neck 

was ordered.  Results of her CT soft tissue neck shows severe pharyngitis 

without evidence of fluid collection.  No evidence of retropharyngeal abscess.  

No evidence of epiglottitis.  Patent infra laryngeal trachea.  Larynx without 

any structural abnormality.  She had a negative chest x-ray performed yesterday.

 Patient was still slightly tachycardic.  Was given a second liter of fluid.  

Discussed patient with Dr. Higuera ENT on-call.  He recommends starting ceftin to 

50 mg twice daily for 10 days.  Recommends taper dose of steroid.  Will 

prescribe 1 dose of 40 mg steroid and to taper to 20 mg for additional 4 days 

for total of 8 days.  Recommend follow-up with primary care physician and to get

a referral to Dr. Higuera to discuss her tonsils.  She agrees with this plan of 

action.  Tolerate secretions.  She is able to eat.  She has not been on 

antibiotics for that long to notice an improvement.  Continue monitoring 

symptoms at home.  This was discussed with mother at bedside as well.  

Improvement of her heart rate 100 and fever.


 (SKYLER ZAPATA)





Impression





   Primary Impression:  


   Recurrent tonsillitis


Disposition:  01 HOME, SELF-CARE


Condition:  Stable





Departure-Patient Inst.


Decision time for Depature:  22:05


 (SKYLER ZAPATA)


Referrals:  


BRIELLE BARAJAS (PCP)


Primary Care Physician








[facility]/CHRISTOPH (Family)


Primary Care Physician








DEMETRIA HIGUERA MD


Patient Instructions:  Sore Throat in Adults, Sore Throat, Adult ED





Add. Discharge Instructions:  


Recommend follow-up your primary care physician for further evaluation and 

follow-up with Dr. Higuera.  If any worsening symptoms return back to ED


Scripts


Prednisone (Prednisone) 20 Mg Tab


20 MG PO UD, #6 TAB


   Take 2 tabs(40mg) for 1 day, and than decrease to 1 tab (20mg)daily


   for additional 4 days.


   Prov: SKYLER ZAPATA         3/4/23 


Cefuroxime Axetil (Cefuroxime) 250 Mg Tablet


250 MG PO BID for 10 Days, #20 TAB


   Prov: SKYLER ZAPATA         3/4/23


Work/School Note:  Work Release Form   Date Seen in the Emergency Department:  

Mar 4, 2023


   Return to Work:  Mar 8, 2023











ATTENDING PHYSICIAN NOTE:


I WAS PHYSICALLY PRESENT AS ER PHYSICIAN, BUT I WAS NOT INVOLVED IN ANY DECISION

MAKING OR ANY CARE OF THIS PATIENT AND I AM NOT COLLABORATING PHYSICIAN. 


 (HORACE TERESA DO)











SKYLER ZAPATA           Mar 4, 2023 20:48


HORACE TERESA DO                  Mar 5, 2023 04:10